# Patient Record
Sex: FEMALE | Race: ASIAN | NOT HISPANIC OR LATINO | URBAN - METROPOLITAN AREA
[De-identification: names, ages, dates, MRNs, and addresses within clinical notes are randomized per-mention and may not be internally consistent; named-entity substitution may affect disease eponyms.]

---

## 2023-08-11 ENCOUNTER — INPATIENT (INPATIENT)
Facility: HOSPITAL | Age: 23
LOS: 2 days | Discharge: HOME | End: 2023-08-14
Attending: UROLOGY | Admitting: UROLOGY
Payer: MEDICAID

## 2023-08-11 ENCOUNTER — TRANSCRIPTION ENCOUNTER (OUTPATIENT)
Age: 23
End: 2023-08-11

## 2023-08-11 VITALS
RESPIRATION RATE: 18 BRPM | SYSTOLIC BLOOD PRESSURE: 152 MMHG | TEMPERATURE: 102 F | HEART RATE: 135 BPM | DIASTOLIC BLOOD PRESSURE: 99 MMHG | OXYGEN SATURATION: 100 %

## 2023-08-11 DIAGNOSIS — N20.0 CALCULUS OF KIDNEY: ICD-10-CM

## 2023-08-11 LAB
ALBUMIN SERPL ELPH-MCNC: 3.9 G/DL — SIGNIFICANT CHANGE UP (ref 3.3–5)
ALP SERPL-CCNC: 85 U/L — SIGNIFICANT CHANGE UP (ref 40–120)
ALT FLD-CCNC: 21 U/L — SIGNIFICANT CHANGE UP (ref 4–33)
ANION GAP SERPL CALC-SCNC: 15 MMOL/L — HIGH (ref 7–14)
APPEARANCE UR: ABNORMAL
APTT BLD: 30.7 SEC — SIGNIFICANT CHANGE UP (ref 24.5–35.6)
AST SERPL-CCNC: 16 U/L — SIGNIFICANT CHANGE UP (ref 4–32)
BASOPHILS # BLD AUTO: 0.04 K/UL — SIGNIFICANT CHANGE UP (ref 0–0.2)
BASOPHILS NFR BLD AUTO: 0.2 % — SIGNIFICANT CHANGE UP (ref 0–2)
BILIRUB SERPL-MCNC: 0.4 MG/DL — SIGNIFICANT CHANGE UP (ref 0.2–1.2)
BILIRUB UR-MCNC: NEGATIVE — SIGNIFICANT CHANGE UP
BUN SERPL-MCNC: 5 MG/DL — LOW (ref 7–23)
CALCIUM SERPL-MCNC: 9.3 MG/DL — SIGNIFICANT CHANGE UP (ref 8.4–10.5)
CHLORIDE SERPL-SCNC: 99 MMOL/L — SIGNIFICANT CHANGE UP (ref 98–107)
CO2 SERPL-SCNC: 22 MMOL/L — SIGNIFICANT CHANGE UP (ref 22–31)
COLOR SPEC: YELLOW — SIGNIFICANT CHANGE UP
CREAT SERPL-MCNC: 0.51 MG/DL — SIGNIFICANT CHANGE UP (ref 0.5–1.3)
DIFF PNL FLD: ABNORMAL
EGFR: 133 ML/MIN/1.73M2 — SIGNIFICANT CHANGE UP
EOSINOPHIL # BLD AUTO: 0.05 K/UL — SIGNIFICANT CHANGE UP (ref 0–0.5)
EOSINOPHIL NFR BLD AUTO: 0.3 % — SIGNIFICANT CHANGE UP (ref 0–6)
GAS PNL BLDV: SIGNIFICANT CHANGE UP
GLUCOSE SERPL-MCNC: 105 MG/DL — HIGH (ref 70–99)
GLUCOSE UR QL: NEGATIVE MG/DL — SIGNIFICANT CHANGE UP
HCG SERPL-ACNC: <1 MIU/ML — SIGNIFICANT CHANGE UP
HCT VFR BLD CALC: 33.4 % — LOW (ref 34.5–45)
HGB BLD-MCNC: 10.4 G/DL — LOW (ref 11.5–15.5)
IANC: 15.25 K/UL — HIGH (ref 1.8–7.4)
IMM GRANULOCYTES NFR BLD AUTO: 0.5 % — SIGNIFICANT CHANGE UP (ref 0–0.9)
INR BLD: 1.27 RATIO — HIGH (ref 0.85–1.18)
KETONES UR-MCNC: 15 MG/DL
LEUKOCYTE ESTERASE UR-ACNC: ABNORMAL
LYMPHOCYTES # BLD AUTO: 1.82 K/UL — SIGNIFICANT CHANGE UP (ref 1–3.3)
LYMPHOCYTES # BLD AUTO: 9.6 % — LOW (ref 13–44)
MCHC RBC-ENTMCNC: 22.5 PG — LOW (ref 27–34)
MCHC RBC-ENTMCNC: 31.1 GM/DL — LOW (ref 32–36)
MCV RBC AUTO: 72.3 FL — LOW (ref 80–100)
MONOCYTES # BLD AUTO: 1.75 K/UL — HIGH (ref 0–0.9)
MONOCYTES NFR BLD AUTO: 9.2 % — SIGNIFICANT CHANGE UP (ref 2–14)
NEUTROPHILS # BLD AUTO: 15.25 K/UL — HIGH (ref 1.8–7.4)
NEUTROPHILS NFR BLD AUTO: 80.2 % — HIGH (ref 43–77)
NITRITE UR-MCNC: POSITIVE
NRBC # BLD: 0 /100 WBCS — SIGNIFICANT CHANGE UP (ref 0–0)
NRBC # FLD: 0 K/UL — SIGNIFICANT CHANGE UP (ref 0–0)
PH UR: 6.5 — SIGNIFICANT CHANGE UP (ref 5–8)
PLATELET # BLD AUTO: 411 K/UL — HIGH (ref 150–400)
POTASSIUM SERPL-MCNC: 3.4 MMOL/L — LOW (ref 3.5–5.3)
POTASSIUM SERPL-SCNC: 3.4 MMOL/L — LOW (ref 3.5–5.3)
PROT SERPL-MCNC: 8 G/DL — SIGNIFICANT CHANGE UP (ref 6–8.3)
PROT UR-MCNC: SIGNIFICANT CHANGE UP MG/DL
PROTHROM AB SERPL-ACNC: 14.2 SEC — HIGH (ref 9.5–13)
RBC # BLD: 4.62 M/UL — SIGNIFICANT CHANGE UP (ref 3.8–5.2)
RBC # FLD: 17 % — HIGH (ref 10.3–14.5)
SODIUM SERPL-SCNC: 136 MMOL/L — SIGNIFICANT CHANGE UP (ref 135–145)
SP GR SPEC: 1.01 — SIGNIFICANT CHANGE UP (ref 1–1.03)
UROBILINOGEN FLD QL: 1 MG/DL — SIGNIFICANT CHANGE UP (ref 0.2–1)
WBC # BLD: 19.01 K/UL — HIGH (ref 3.8–10.5)
WBC # FLD AUTO: 19.01 K/UL — HIGH (ref 3.8–10.5)

## 2023-08-11 PROCEDURE — 99285 EMERGENCY DEPT VISIT HI MDM: CPT

## 2023-08-11 PROCEDURE — 74176 CT ABD & PELVIS W/O CONTRAST: CPT | Mod: 26,MA

## 2023-08-11 RX ORDER — SODIUM CHLORIDE 9 MG/ML
1000 INJECTION INTRAMUSCULAR; INTRAVENOUS; SUBCUTANEOUS ONCE
Refills: 0 | Status: COMPLETED | OUTPATIENT
Start: 2023-08-11 | End: 2023-08-11

## 2023-08-11 RX ORDER — CEFTRIAXONE 500 MG/1
1000 INJECTION, POWDER, FOR SOLUTION INTRAMUSCULAR; INTRAVENOUS ONCE
Refills: 0 | Status: COMPLETED | OUTPATIENT
Start: 2023-08-11 | End: 2023-08-11

## 2023-08-11 RX ORDER — ACETAMINOPHEN 500 MG
1000 TABLET ORAL ONCE
Refills: 0 | Status: COMPLETED | OUTPATIENT
Start: 2023-08-11 | End: 2023-08-11

## 2023-08-11 RX ADMIN — Medication 1000 MILLIGRAM(S): at 18:09

## 2023-08-11 RX ADMIN — SODIUM CHLORIDE 1000 MILLILITER(S): 9 INJECTION INTRAMUSCULAR; INTRAVENOUS; SUBCUTANEOUS at 19:15

## 2023-08-11 RX ADMIN — CEFTRIAXONE 100 MILLIGRAM(S): 500 INJECTION, POWDER, FOR SOLUTION INTRAMUSCULAR; INTRAVENOUS at 18:38

## 2023-08-11 RX ADMIN — SODIUM CHLORIDE 1000 MILLILITER(S): 9 INJECTION INTRAMUSCULAR; INTRAVENOUS; SUBCUTANEOUS at 19:04

## 2023-08-11 RX ADMIN — SODIUM CHLORIDE 1000 MILLILITER(S): 9 INJECTION INTRAMUSCULAR; INTRAVENOUS; SUBCUTANEOUS at 17:45

## 2023-08-11 RX ADMIN — Medication 1000 MILLIGRAM(S): at 19:04

## 2023-08-11 RX ADMIN — CEFTRIAXONE 1000 MILLIGRAM(S): 500 INJECTION, POWDER, FOR SOLUTION INTRAMUSCULAR; INTRAVENOUS at 19:04

## 2023-08-11 RX ADMIN — Medication 400 MILLIGRAM(S): at 17:45

## 2023-08-11 NOTE — ED PROVIDER NOTE - PHYSICAL EXAMINATION
Physical Exam:  Gen: NAD, AOx3, non-toxic appearing, able to ambulate without assistance  Head: NCAT  HEENT: EOMI, PEERLA, normal conjunctiva, tongue midline, oral mucosa moist  Lung: CTAB, no respiratory distress, no wheezes/rhonchi/rales B/L, speaking in full sentences  CV: RRR, no murmurs, rubs or gallops  Abd: soft, NT, ND, no guarding, no rigidity, no rebound tenderness, + CVA tenderness   MSK: no visible deformities, ROM normal in UE/LE, no back pain  Neuro: No focal sensory or motor deficits  Skin: Warm, well perfused, no rash, no leg swelling  Psych: normal affect, calm

## 2023-08-11 NOTE — ED ADULT TRIAGE NOTE - CHIEF COMPLAINT QUOTE
Pt c/o worsening L flank pain x 2 weeks. Diagnosed with kidney stones at Urgent Care. Pt given Tylenol at Urgent Care today but threw it up. Febrile and tachy in triage.

## 2023-08-11 NOTE — ED ADULT TRIAGE NOTE - TEMPERATURE IN CELSIUS (DEGREES C)
Pt just began with a cough 6 days ago and was SOB,   He then progressed to a cough with congestion and sputum is now green, and he has a PMH of pnuemonia 38.8

## 2023-08-11 NOTE — ED ADULT TRIAGE NOTE - MODE OF ARRIVAL
Patient has wife support at home, no d/c needs. Reason for Admission:  Coronary artery disease [I25.10]  CAD (coronary artery disease) [I25.10]  CAD (coronary artery disease) [I25.10]                 RRAT Score:    10            Plan for utilizing home health:    no                      Likelihood of Readmission:   LOW                         Transition of Care Plan:              Initial assessment completed with patient. Cognitive status of patient: oriented to time, place, person and situation. Face sheet information confirmed:  yes. The patient designates wife to participate in his discharge plan and to receive any needed information. This patient lives in a single family home with wife. Patient is able to navigate steps as needed. Prior to hospitalization, patient was considered to be independent with ADLs/IADLS : yes . Patient has a current ACP document on file: no  The wife will be available to transport patient home upon discharge. The patient has no medical equipment available in the home. Patient is not currently active with home health. Patient has not stayed in a skilled nursing facility or rehab. This patient is on dialysis :no     Currently, the discharge plan is Home. The patient states that he can obtain his medications from the pharmacy, and take his medications as directed. Patient's current insurance is Medicare and [address] [place] [address] Management Interventions  PCP Verified by CM:  Yes  Palliative Care Criteria Met (RRAT>21 & CHF Dx)?: No  Mode of Transport at Discharge: Self  Transition of Care Consult (CM Consult): Discharge Planning  Current Support Network: Lives with Spouse  Confirm Follow Up Transport: Family  Plan discussed with Pt/Family/Caregiver: Yes  Discharge Location  Discharge Placement: Home        CARMEL Stoddard  Case Management  [phone] Walk in

## 2023-08-11 NOTE — ED PROVIDER NOTE - ATTENDING CONTRIBUTION TO CARE
I performed a face-to-face evaluation of the patient and performed a history and physical examination. I agree with the history and physical examination. If this was a PA visit, I personally saw the patient with the PA and performed a substantive portion of the visit including all aspects of the medical decision making.    H/o renal stones. P/w several days L lower flank pain that started sharp and sudden. 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 P/w sepsis vital signs: F, tachycardia. Continued L lower flank pain. Concern for infected stone. Check sepsis labs (UA, UCx, BCx, lactate). Give antipyretics (if needed). Give IVF and ABx.

## 2023-08-11 NOTE — ED PROVIDER NOTE - CLINICAL SUMMARY MEDICAL DECISION MAKING FREE TEXT BOX
Patient presents emergency department complaining of flank pain.  Patient is hemodynamically stable.  Patient is tachycardic and febrile on presentation.  Patient meets sepsis criteria will obtain sepsis labs.  Differential diagnosis includes but is not limited to kidney stone, UTI versus pyelonephritis versus disposition

## 2023-08-11 NOTE — ED PROVIDER NOTE - OBJECTIVE STATEMENT
Patient is a 23-year-old female with no significant past medical history who presents emergency department planing of urinary symptoms and left-sided flank tenderness.  Patient states that 3 days ago she was diagnosed with a UTI and kidney stones and was put on Macrobid.  Patient states her symptoms did not resolve after 3 days and went to her primary care doctor today to get evaluated and was put on Bactrim.  However upon returning home she had increased fever and increased flank pain so she decided to come to the emergency department to get evaluated.  Patient states that it burns when she pees however denies any hematuria.  Patient states the flank pain is on the left side and intermittent in nature.  Patient has not taken anything for the pain today.  Patient states that she was nauseous earlier today but denies any nausea at this time.  Patient denies any chest pain, shortness of breath, abdominal pain, diarrhea, constipation Patient is a 23-year-old female with h/o renal stones who presents emergency department planing of urinary symptoms and left-sided flank tenderness.  Patient states that 3 days ago she was diagnosed with a UTI and kidney stones and was put on Macrobid.  Patient states her symptoms did not resolve after 3 days and went to her primary care doctor today to get evaluated and was put on Bactrim.  However upon returning home she had increased fever and increased flank pain so she decided to come to the emergency department to get evaluated.  Patient states that it burns when she pees however denies any hematuria.  Patient states the flank pain is on the left side and intermittent in nature.  Patient has not taken anything for the pain today.  Patient states that she was nauseous earlier today but denies any nausea at this time.  Patient denies any chest pain, shortness of breath, abdominal pain, diarrhea, constipation

## 2023-08-11 NOTE — ED ADULT NURSE NOTE - CHIEF COMPLAINT
How Severe Are Your Spot(S)?: moderate What Type Of Note Output Would You Prefer (Optional)?: Bullet Format What Is The Reason For Today's Visit?: Full Body Skin Examination with No Concerns What Is The Reason For Today's Visit? (Being Monitored For X): concerning skin lesions on an annual basis The patient is a 23y Female complaining of flank pain.

## 2023-08-11 NOTE — ED ADULT NURSE NOTE - OBJECTIVE STATEMENT
patient AOX4 came in c/o left flank pain with fever and chills since few days. on CM shows sinus tach. skin warm and d ry, breathing even and unlabored. NAD, VS as noted. labs done as ordered. meds given as ordered. awaiting results and re eval.

## 2023-08-12 DIAGNOSIS — A41.89 OTHER SPECIFIED SEPSIS: ICD-10-CM

## 2023-08-12 DIAGNOSIS — N20.1 CALCULUS OF URETER: ICD-10-CM

## 2023-08-12 LAB
ANION GAP SERPL CALC-SCNC: 13 MMOL/L — SIGNIFICANT CHANGE UP (ref 7–14)
BUN SERPL-MCNC: 4 MG/DL — LOW (ref 7–23)
CALCIUM SERPL-MCNC: 8.4 MG/DL — SIGNIFICANT CHANGE UP (ref 8.4–10.5)
CHLORIDE SERPL-SCNC: 106 MMOL/L — SIGNIFICANT CHANGE UP (ref 98–107)
CO2 SERPL-SCNC: 20 MMOL/L — LOW (ref 22–31)
CREAT SERPL-MCNC: 0.42 MG/DL — LOW (ref 0.5–1.3)
EGFR: 139 ML/MIN/1.73M2 — SIGNIFICANT CHANGE UP
GLUCOSE SERPL-MCNC: 94 MG/DL — SIGNIFICANT CHANGE UP (ref 70–99)
HCT VFR BLD CALC: 32.7 % — LOW (ref 34.5–45)
HGB BLD-MCNC: 9.8 G/DL — LOW (ref 11.5–15.5)
MCHC RBC-ENTMCNC: 22.3 PG — LOW (ref 27–34)
MCHC RBC-ENTMCNC: 30 GM/DL — LOW (ref 32–36)
MCV RBC AUTO: 74.5 FL — LOW (ref 80–100)
NRBC # BLD: 0 /100 WBCS — SIGNIFICANT CHANGE UP (ref 0–0)
NRBC # FLD: 0 K/UL — SIGNIFICANT CHANGE UP (ref 0–0)
PLATELET # BLD AUTO: 339 K/UL — SIGNIFICANT CHANGE UP (ref 150–400)
POTASSIUM SERPL-MCNC: 3.1 MMOL/L — LOW (ref 3.5–5.3)
POTASSIUM SERPL-SCNC: 3.1 MMOL/L — LOW (ref 3.5–5.3)
RBC # BLD: 4.39 M/UL — SIGNIFICANT CHANGE UP (ref 3.8–5.2)
RBC # FLD: 17 % — HIGH (ref 10.3–14.5)
SODIUM SERPL-SCNC: 139 MMOL/L — SIGNIFICANT CHANGE UP (ref 135–145)
WBC # BLD: 10.53 K/UL — HIGH (ref 3.8–10.5)
WBC # FLD AUTO: 10.53 K/UL — HIGH (ref 3.8–10.5)

## 2023-08-12 PROCEDURE — 52332 CYSTOSCOPY AND TREATMENT: CPT | Mod: LT

## 2023-08-12 PROCEDURE — 74420 UROGRAPHY RTRGR +-KUB: CPT | Mod: 26

## 2023-08-12 PROCEDURE — 99222 1ST HOSP IP/OBS MODERATE 55: CPT | Mod: 57,25

## 2023-08-12 DEVICE — URETERAL CATH OPEN END 5FR 70CM: Type: IMPLANTABLE DEVICE | Site: LEFT | Status: FUNCTIONAL

## 2023-08-12 DEVICE — GUIDEWIRE SENSOR DUAL-FLEX NITINOL STRAIGHT .038" X 150CM: Type: IMPLANTABLE DEVICE | Site: LEFT | Status: FUNCTIONAL

## 2023-08-12 DEVICE — URETERAL STENT PERCUFLEX PLUS 6FR 24CM: Type: IMPLANTABLE DEVICE | Site: LEFT | Status: FUNCTIONAL

## 2023-08-12 RX ORDER — FENTANYL CITRATE 50 UG/ML
50 INJECTION INTRAVENOUS
Refills: 0 | Status: DISCONTINUED | OUTPATIENT
Start: 2023-08-12 | End: 2023-08-12

## 2023-08-12 RX ORDER — SODIUM CHLORIDE 9 MG/ML
1000 INJECTION, SOLUTION INTRAVENOUS
Refills: 0 | Status: DISCONTINUED | OUTPATIENT
Start: 2023-08-12 | End: 2023-08-13

## 2023-08-12 RX ORDER — ONDANSETRON 8 MG/1
4 TABLET, FILM COATED ORAL ONCE
Refills: 0 | Status: DISCONTINUED | OUTPATIENT
Start: 2023-08-12 | End: 2023-08-12

## 2023-08-12 RX ORDER — POTASSIUM CHLORIDE 20 MEQ
20 PACKET (EA) ORAL
Refills: 0 | Status: COMPLETED | OUTPATIENT
Start: 2023-08-12 | End: 2023-08-12

## 2023-08-12 RX ORDER — KETOROLAC TROMETHAMINE 30 MG/ML
15 SYRINGE (ML) INJECTION EVERY 6 HOURS
Refills: 0 | Status: DISCONTINUED | OUTPATIENT
Start: 2023-08-12 | End: 2023-08-14

## 2023-08-12 RX ORDER — TAMSULOSIN HYDROCHLORIDE 0.4 MG/1
0.4 CAPSULE ORAL AT BEDTIME
Refills: 0 | Status: DISCONTINUED | OUTPATIENT
Start: 2023-08-12 | End: 2023-08-14

## 2023-08-12 RX ORDER — OXYCODONE HYDROCHLORIDE 5 MG/1
5 TABLET ORAL ONCE
Refills: 0 | Status: DISCONTINUED | OUTPATIENT
Start: 2023-08-12 | End: 2023-08-12

## 2023-08-12 RX ORDER — CEFTRIAXONE 500 MG/1
1000 INJECTION, POWDER, FOR SOLUTION INTRAMUSCULAR; INTRAVENOUS EVERY 24 HOURS
Refills: 0 | Status: DISCONTINUED | OUTPATIENT
Start: 2023-08-12 | End: 2023-08-14

## 2023-08-12 RX ORDER — OXYCODONE HYDROCHLORIDE 5 MG/1
5 TABLET ORAL EVERY 4 HOURS
Refills: 0 | Status: DISCONTINUED | OUTPATIENT
Start: 2023-08-12 | End: 2023-08-12

## 2023-08-12 RX ORDER — KETOROLAC TROMETHAMINE 30 MG/ML
15 SYRINGE (ML) INJECTION ONCE
Refills: 0 | Status: DISCONTINUED | OUTPATIENT
Start: 2023-08-12 | End: 2023-08-12

## 2023-08-12 RX ORDER — ACETAMINOPHEN 500 MG
975 TABLET ORAL EVERY 6 HOURS
Refills: 0 | Status: DISCONTINUED | OUTPATIENT
Start: 2023-08-12 | End: 2023-08-14

## 2023-08-12 RX ORDER — METOCLOPRAMIDE HCL 10 MG
10 TABLET ORAL ONCE
Refills: 0 | Status: DISCONTINUED | OUTPATIENT
Start: 2023-08-12 | End: 2023-08-12

## 2023-08-12 RX ORDER — HEPARIN SODIUM 5000 [USP'U]/ML
5000 INJECTION INTRAVENOUS; SUBCUTANEOUS EVERY 8 HOURS
Refills: 0 | Status: DISCONTINUED | OUTPATIENT
Start: 2023-08-12 | End: 2023-08-14

## 2023-08-12 RX ORDER — FENTANYL CITRATE 50 UG/ML
25 INJECTION INTRAVENOUS
Refills: 0 | Status: DISCONTINUED | OUTPATIENT
Start: 2023-08-12 | End: 2023-08-12

## 2023-08-12 RX ADMIN — Medication 20 MILLIEQUIVALENT(S): at 15:09

## 2023-08-12 RX ADMIN — HEPARIN SODIUM 5000 UNIT(S): 5000 INJECTION INTRAVENOUS; SUBCUTANEOUS at 22:16

## 2023-08-12 RX ADMIN — SODIUM CHLORIDE 150 MILLILITER(S): 9 INJECTION, SOLUTION INTRAVENOUS at 04:58

## 2023-08-12 RX ADMIN — SODIUM CHLORIDE 125 MILLILITER(S): 9 INJECTION, SOLUTION INTRAVENOUS at 06:57

## 2023-08-12 RX ADMIN — Medication 15 MILLIGRAM(S): at 00:59

## 2023-08-12 RX ADMIN — HEPARIN SODIUM 5000 UNIT(S): 5000 INJECTION INTRAVENOUS; SUBCUTANEOUS at 13:29

## 2023-08-12 RX ADMIN — Medication 15 MILLIGRAM(S): at 07:18

## 2023-08-12 RX ADMIN — Medication 15 MILLIGRAM(S): at 18:53

## 2023-08-12 RX ADMIN — OXYCODONE HYDROCHLORIDE 5 MILLIGRAM(S): 5 TABLET ORAL at 12:45

## 2023-08-12 RX ADMIN — OXYCODONE HYDROCHLORIDE 5 MILLIGRAM(S): 5 TABLET ORAL at 11:50

## 2023-08-12 RX ADMIN — Medication 20 MILLIEQUIVALENT(S): at 16:40

## 2023-08-12 RX ADMIN — Medication 15 MILLIGRAM(S): at 19:01

## 2023-08-12 RX ADMIN — Medication 15 MILLIGRAM(S): at 07:33

## 2023-08-12 RX ADMIN — Medication 975 MILLIGRAM(S): at 23:09

## 2023-08-12 RX ADMIN — SODIUM CHLORIDE 125 MILLILITER(S): 9 INJECTION, SOLUTION INTRAVENOUS at 16:41

## 2023-08-12 RX ADMIN — TAMSULOSIN HYDROCHLORIDE 0.4 MILLIGRAM(S): 0.4 CAPSULE ORAL at 22:16

## 2023-08-12 RX ADMIN — SODIUM CHLORIDE 125 MILLILITER(S): 9 INJECTION, SOLUTION INTRAVENOUS at 22:16

## 2023-08-12 RX ADMIN — HEPARIN SODIUM 5000 UNIT(S): 5000 INJECTION INTRAVENOUS; SUBCUTANEOUS at 06:57

## 2023-08-12 RX ADMIN — Medication 975 MILLIGRAM(S): at 23:30

## 2023-08-12 NOTE — BRIEF OPERATIVE NOTE - NSICDXBRIEFPROCEDURE_GEN_ALL_CORE_FT
PROCEDURES:  Cystoscopy with percutaneous insertion of ureteral stent 12-Aug-2023 06:28:18  Clemente Crum

## 2023-08-12 NOTE — PATIENT PROFILE ADULT - PATIENT'S SEXUAL ORIENTATION
Ventricular Rate : 93   Atrial Rate : 138   QRS Duration : 80   Q-T Interval : 372   QTC Calculation(Bezet) : 462   R Axis : 69   T Axis : 71   Diagnosis : Atrial fibrillation~Abnormal ECG~When compared with ECG of 30-APR-2018 23:20,~Nonspecific T wave abnormality now evident in Lateral leads~Confirmed by JAX HINKLE ([id number]) on 5/1/2018 12:52:31 PM      Heterosexual

## 2023-08-12 NOTE — H&P ADULT - NSHPLABSRESULTS_GEN_ALL_CORE
10.4   19.01 )-----------( 411      ( 11 Aug 2023 17:45 )             33.4   08-    136  |  99  |  5<L>  ----------------------------<  105<H>  3.4<L>   |  22  |  0.51    Ca    9.3      11 Aug 2023 17:45    TPro  8.0  /  Alb  3.9  /  TBili  0.4  /  DBili  x   /  AST  16  /  ALT  21  /  AlkPhos  85  08    Urinalysis Basic - ( 11 Aug 2023 20:56 )    Color: Yellow / Appearance: Cloudy / S.012 / pH: x  Gluc: x / Ketone: 15 mg/dL  / Bili: Negative / Urobili: 1.0 mg/dL   Blood: x / Protein: Trace mg/dL / Nitrite: Positive   Leuk Esterase: Moderate / RBC: 4 /HPF / WBC 40 /HPF   Sq Epi: x / Non Sq Epi: 17 /HPF / Bacteria: Moderate /HPF    < from: CT Abdomen and Pelvis No Cont (23 @ 23:38) >    IMPRESSION:  2 mm left UVJ stone with upstream asymmetric enlargement of the left   kidney, without fady hydroureteronephrosis or perinephric fluid   collections.    5.0 cm right adnexal cyst.    --- End of Report ---    AZUL OATES MD; Resident Radiologist  This document has been electronically signed.  BRADLEY CORONA MD; Attending Radiologist  This document has been electronically signed. Aug 12 2023  3:09AM    < end of copied text >

## 2023-08-12 NOTE — CHART NOTE - NSCHARTNOTEFT_GEN_A_CORE
SURGICAL POST-OP CHECK NOTE:    Procedure: L ureteral stent    Subjective: doing well post op. no complaints. pain controlled, stable, afebrile.     Vital Signs Last 24 Hrs  T(C): 36.8 (12 Aug 2023 11:40), Max: 38.8 (11 Aug 2023 16:18)  T(F): 98.2 (12 Aug 2023 11:40), Max: 101.9 (11 Aug 2023 16:18)  HR: 78 (12 Aug 2023 11:40) (78 - 135)  BP: 135/79 (12 Aug 2023 11:40) (123/86 - 152/99)  BP(mean): 91 (12 Aug 2023 11:00) (90 - 97)  RR: 15 (12 Aug 2023 11:40) (15 - 24)  SpO2: 100% (12 Aug 2023 11:40) (95% - 100%)    Parameters below as of 12 Aug 2023 11:40  Patient On (Oxygen Delivery Method): room air      I&O's Summary    11 Aug 2023 07:01  -  12 Aug 2023 07:00  --------------------------------------------------------  IN: 175 mL / OUT: 0 mL / NET: 175 mL    12 Aug 2023 07:01  -  12 Aug 2023 16:01  --------------------------------------------------------  IN: 400 mL / OUT: 550 mL / NET: -150 mL                            9.8    10.53 )-----------( 339      ( 12 Aug 2023 06:42 )             32.7     08-12    139  |  106  |  4<L>  ----------------------------<  94  3.1<L>   |  20<L>  |  0.42<L>    Ca    8.4      12 Aug 2023 06:42    TPro  8.0  /  Alb  3.9  /  TBili  0.4  /  DBili  x   /  AST  16  /  ALT  21  /  AlkPhos  85  08-11   PT/INR - ( 11 Aug 2023 17:45 )   PT: 14.2 sec;   INR: 1.27 ratio         PTT - ( 11 Aug 2023 17:45 )  PTT:30.7 sec    PHYSICAL EXAM:  Gen: NAD, resting comfortably, conversive.   Cardiopulm: Non-labored breathing.   Ab: Soft, appropriately tender, nondistended. No CVAT  Ext: Moves all 4 spontaneously.     ASSESSMENT:   24yo F with L ureteral stent    PLAN:   Antibiotics pending cultures  Fluids  Regular diet  monitor for fevers

## 2023-08-12 NOTE — ED ADULT NURSE REASSESSMENT NOTE - NS ED NURSE REASSESS COMMENT FT1
Patient brought to OR by surgical resident. Valuables collected and brought to security. Receipt to be tubed to #[phone].
patient c/o nausea, pending CT. awaiting UA and CT, will continue to monitor

## 2023-08-12 NOTE — H&P ADULT - PROBLEM SELECTOR PLAN 1
Admit to Urology  -Emergent operative stent placement  -f/u all cultures  -continue Ceftriaxone  -IVF  -Analgesia and antiemetics prn  -NPO  -Discussed with Dr. Lopez

## 2023-08-12 NOTE — PROVIDER CONTACT NOTE (MEDICATION) - RECOMMENDATIONS
patient has no order for severe pain, paged MD mentioned re: med for severe pain, MD verbalized to give patient tylenol at this time.

## 2023-08-12 NOTE — H&P ADULT - NSHPPHYSICALEXAM_GEN_ALL_CORE
Vital Signs Last 24 Hrs  T(C): 37.8 (11 Aug 2023 19:01), Max: 38.8 (11 Aug 2023 16:18)  T(F): 100.1 (11 Aug 2023 19:01), Max: 101.9 (11 Aug 2023 16:18)  HR: 98 (11 Aug 2023 20:50) (98 - 135)  BP: 130/76 (11 Aug 2023 20:50) (129/76 - 152/99)  BP(mean): --  RR: 20 (11 Aug 2023 20:50) (18 - 20)  SpO2: 100% (11 Aug 2023 20:50) (98% - 100%)    Parameters below as of 11 Aug 2023 20:50  Patient On (Oxygen Delivery Method): room air    General: A+O, no distress    Pulm: clear    CV: regular    Abdomen: soft, nontender    : +L CVAT    Extremities: no tenderness    Neuro: nonfocal    Psych: appropriate

## 2023-08-12 NOTE — H&P ADULT - HISTORY OF PRESENT ILLNESS
24 y/o female with prior h/o nephrolithiasis without prior interventions presenting with 3 days of left sided flank pain, nausea, and fevers.  Reports she initially had dysuria and was started on Macrobid three days ago.  She was transitioned to Bactrim yesterday after having no improvement.  Developed a fever to 103 at home.  Reports nausea with multiple episodes of emesis.  Febrile in ED to 101.9  with tachycardia to 135.  CT revealing 2mm L UVJ stone.

## 2023-08-12 NOTE — H&P ADULT - ATTENDING COMMENTS
Sepsis 2/2 obstructive pyelo in setting of 2mm L UVJ stone. Pt febrile with tachycardia and leukocytosis. UA concerning for UTI. No real hydro on L but significant perinephric stranding and distal stone as above. Plan on emergent stent. Discussed potential for decompensation with pt--lower risk given age and lack of comorbid conditions.

## 2023-08-12 NOTE — H&P ADULT - NSICDXFAMILYHX_GEN_ALL_CORE_FT
Fine for 3 months  
Medication: CITALOPRAM BUT THE GENERIC (COST)    PCP: Augustin Arceo MD  Preferred Contact Number:     mobile [phone]         Pharmacy:  [facility]    Patient instructed to call pharmacy directly for future refills.      Advised patient that the nurse will call if there are questions or concerns, otherwise refill processing may take 48-72 hours.     
Refill request for: Citalopram-due-last filled on 3/20/17.     LOV- 1/20/17.   
Refill sent.   
FAMILY HISTORY:  No pertinent family history in first degree relatives

## 2023-08-12 NOTE — H&P ADULT - ASSESSMENT
24 y/o female with h/o nephrolithiasis without prior interventions, presenting with fevers, L flank pain and nausea, in setting of 2mm L UVJ stone and sepsis

## 2023-08-13 LAB
ANION GAP SERPL CALC-SCNC: 11 MMOL/L — SIGNIFICANT CHANGE UP (ref 7–14)
BUN SERPL-MCNC: 5 MG/DL — LOW (ref 7–23)
CALCIUM SERPL-MCNC: 8.5 MG/DL — SIGNIFICANT CHANGE UP (ref 8.4–10.5)
CHLORIDE SERPL-SCNC: 106 MMOL/L — SIGNIFICANT CHANGE UP (ref 98–107)
CO2 SERPL-SCNC: 23 MMOL/L — SIGNIFICANT CHANGE UP (ref 22–31)
CREAT SERPL-MCNC: 0.42 MG/DL — LOW (ref 0.5–1.3)
EGFR: 139 ML/MIN/1.73M2 — SIGNIFICANT CHANGE UP
GLUCOSE SERPL-MCNC: 107 MG/DL — HIGH (ref 70–99)
HCT VFR BLD CALC: 28.4 % — LOW (ref 34.5–45)
HGB BLD-MCNC: 8.8 G/DL — LOW (ref 11.5–15.5)
MCHC RBC-ENTMCNC: 22.6 PG — LOW (ref 27–34)
MCHC RBC-ENTMCNC: 31 GM/DL — LOW (ref 32–36)
MCV RBC AUTO: 73 FL — LOW (ref 80–100)
NRBC # BLD: 0 /100 WBCS — SIGNIFICANT CHANGE UP (ref 0–0)
NRBC # FLD: 0 K/UL — SIGNIFICANT CHANGE UP (ref 0–0)
PLATELET # BLD AUTO: 423 K/UL — HIGH (ref 150–400)
POTASSIUM SERPL-MCNC: 3.6 MMOL/L — SIGNIFICANT CHANGE UP (ref 3.5–5.3)
POTASSIUM SERPL-SCNC: 3.6 MMOL/L — SIGNIFICANT CHANGE UP (ref 3.5–5.3)
RBC # BLD: 3.89 M/UL — SIGNIFICANT CHANGE UP (ref 3.8–5.2)
RBC # FLD: 16.8 % — HIGH (ref 10.3–14.5)
SODIUM SERPL-SCNC: 140 MMOL/L — SIGNIFICANT CHANGE UP (ref 135–145)
WBC # BLD: 11.67 K/UL — HIGH (ref 3.8–10.5)
WBC # FLD AUTO: 11.67 K/UL — HIGH (ref 3.8–10.5)

## 2023-08-13 PROCEDURE — 99232 SBSQ HOSP IP/OBS MODERATE 35: CPT

## 2023-08-13 RX ORDER — OXYCODONE HYDROCHLORIDE 5 MG/1
5 TABLET ORAL ONCE
Refills: 0 | Status: DISCONTINUED | OUTPATIENT
Start: 2023-08-13 | End: 2023-08-13

## 2023-08-13 RX ADMIN — HEPARIN SODIUM 5000 UNIT(S): 5000 INJECTION INTRAVENOUS; SUBCUTANEOUS at 14:39

## 2023-08-13 RX ADMIN — Medication 15 MILLIGRAM(S): at 18:08

## 2023-08-13 RX ADMIN — Medication 15 MILLIGRAM(S): at 18:38

## 2023-08-13 RX ADMIN — CEFTRIAXONE 100 MILLIGRAM(S): 500 INJECTION, POWDER, FOR SOLUTION INTRAMUSCULAR; INTRAVENOUS at 06:13

## 2023-08-13 RX ADMIN — OXYCODONE HYDROCHLORIDE 5 MILLIGRAM(S): 5 TABLET ORAL at 12:13

## 2023-08-13 RX ADMIN — Medication 15 MILLIGRAM(S): at 01:15

## 2023-08-13 RX ADMIN — HEPARIN SODIUM 5000 UNIT(S): 5000 INJECTION INTRAVENOUS; SUBCUTANEOUS at 21:00

## 2023-08-13 RX ADMIN — TAMSULOSIN HYDROCHLORIDE 0.4 MILLIGRAM(S): 0.4 CAPSULE ORAL at 21:00

## 2023-08-13 RX ADMIN — OXYCODONE HYDROCHLORIDE 5 MILLIGRAM(S): 5 TABLET ORAL at 13:15

## 2023-08-13 RX ADMIN — Medication 15 MILLIGRAM(S): at 00:58

## 2023-08-13 RX ADMIN — HEPARIN SODIUM 5000 UNIT(S): 5000 INJECTION INTRAVENOUS; SUBCUTANEOUS at 06:13

## 2023-08-14 ENCOUNTER — TRANSCRIPTION ENCOUNTER (OUTPATIENT)
Age: 23
End: 2023-08-14

## 2023-08-14 VITALS
SYSTOLIC BLOOD PRESSURE: 118 MMHG | DIASTOLIC BLOOD PRESSURE: 83 MMHG | OXYGEN SATURATION: 100 % | TEMPERATURE: 99 F | HEART RATE: 91 BPM | RESPIRATION RATE: 18 BRPM

## 2023-08-14 RX ORDER — CEFDINIR 250 MG/5ML
1 POWDER, FOR SUSPENSION ORAL
Qty: 20 | Refills: 0
Start: 2023-08-14 | End: 2023-08-23

## 2023-08-14 RX ADMIN — CEFTRIAXONE 100 MILLIGRAM(S): 500 INJECTION, POWDER, FOR SOLUTION INTRAMUSCULAR; INTRAVENOUS at 05:07

## 2023-08-14 RX ADMIN — HEPARIN SODIUM 5000 UNIT(S): 5000 INJECTION INTRAVENOUS; SUBCUTANEOUS at 05:07

## 2023-08-14 RX ADMIN — Medication 975 MILLIGRAM(S): at 05:07

## 2023-08-14 RX ADMIN — Medication 975 MILLIGRAM(S): at 06:07

## 2023-08-14 NOTE — DISCHARGE NOTE PROVIDER - CARE PROVIDER_API CALL
Brii Lopez  Urology  [address] [place] [address], [address] [address]  Phone: [phone]  Fax: [phone]  Follow Up Time:

## 2023-08-14 NOTE — DISCHARGE NOTE NURSING/CASE MANAGEMENT/SOCIAL WORK - NSDCPEFALRISK_GEN_ALL_CORE
For information on Fall & Injury Prevention, visit: [email or web address] OR  [email or web address] OR  https://www.cdc.gov/steadi/patient.html

## 2023-08-14 NOTE — DISCHARGE NOTE NURSING/CASE MANAGEMENT/SOCIAL WORK - NSDCPNINST_GEN_ALL_CORE
pt notify md if fever, chills, nausea, vomiting, pain not relieved by medication, vaginal bleeding, or return of presenting symptoms.

## 2023-08-14 NOTE — DISCHARGE NOTE NURSING/CASE MANAGEMENT/SOCIAL WORK - PATIENT PORTAL LINK FT
You can access the FollowMyHealth Patient Portal offered by [facility] by registering at the following website: [email or web address]. By joining [employer]’s FollowMyHealth portal, you will also be able to view your health information using other applications (apps) compatible with our system.

## 2023-08-14 NOTE — DISCHARGE NOTE PROVIDER - NSDCCPCAREPLAN_GEN_ALL_CORE_FT
PRINCIPAL DISCHARGE DIAGNOSIS  Diagnosis: Ureteral stone  Assessment and Plan of Treatment: Drink plenty of fluids  Call Dr's office for appt to remove stone/stent  Call office for a fever over 101      SECONDARY DISCHARGE DIAGNOSES  Diagnosis: Acute UTI  Assessment and Plan of Treatment:

## 2023-08-14 NOTE — PROGRESS NOTE ADULT - SUBJECTIVE AND OBJECTIVE BOX
POD #2  Afeb 130/85 95 99%RA    Pt has no c/o  Abd- soft NT ND   Void 1L
ANESTHESIA POSTOP CHECK    23y Female POSTOP DAY 1 S/P     Vital Signs Last 24 Hrs  T(C): 37.1 (14 Aug 2023 10:12), Max: 37.4 (14 Aug 2023 05:07)  T(F): 98.7 (14 Aug 2023 10:12), Max: 99.4 (14 Aug 2023 05:07)  HR: 91 (14 Aug 2023 10:12) (83 - 98)  BP: 118/83 (14 Aug 2023 10:12) (118/83 - 135/81)  BP(mean): --  RR: 18 (14 Aug 2023 10:12) (16 - 18)  SpO2: 100% (14 Aug 2023 10:12) (98% - 100%)    Parameters below as of 14 Aug 2023 10:12  Patient On (Oxygen Delivery Method): room air      I&O's Summary    13 Aug 2023 07:01  -  14 Aug 2023 07:00  --------------------------------------------------------  IN: 290 mL / OUT: 2300 mL / NET: -2010 mL        [ x] NO APPARENT ANESTHESIA COMPLICATIONS      
Subjective  AVSS. NAEON. Ambulating, pain controlled.     Objective    Vital signs  T(F): , Max: 98.4 (08-13-23 @ 01:00)  HR: 93 (08-13-23 @ 06:19)  BP: 126/87 (08-13-23 @ 06:19)  SpO2: 98% (08-13-23 @ 06:19)  Wt(kg): --    Output     OUT:    Voided (mL): 650 mL  Total OUT: 650 mL    Total NET: -650 mL          Gen: NAD  Abd: soft, nontender, nondistended  : voiding without issue    Labs      08-13 @ 06:13    WBC 11.67 / Hct 28.4  / SCr 0.42     08-12 @ 06:42    WBC 10.53 / Hct 32.7  / SCr 0.42         Culture - Blood (collected 08-11-23 @ 17:45)  Source: .Blood Blood-Peripheral  Preliminary Report (08-12-23 @ 22:02):    No growth at 24 hours    Culture - Blood (collected 08-11-23 @ 17:30)  Source: .Blood Blood-Peripheral  Preliminary Report (08-12-23 @ 22:02):    No growth at 24 hours        Urine Cx: pending  Blood Cx: pending    Imaging

## 2023-08-14 NOTE — DISCHARGE NOTE PROVIDER - HOSPITAL COURSE
Pt admitted with L. UVJ stone; 2mm; stent inserted; labs stable; growing E. coli in urine, blood NTD; ambulating; voiding well; no fever; earnest reg diet; pain controlled; home today on cefdinir; f/u in office for stone removal; will keep on culture list.

## 2023-08-15 PROBLEM — Z00.00 ENCOUNTER FOR PREVENTIVE HEALTH EXAMINATION: Status: ACTIVE | Noted: 2023-08-15

## 2023-08-15 LAB
-  AMIKACIN: SIGNIFICANT CHANGE UP
-  AMOXICILLIN/CLAVULANIC ACID: SIGNIFICANT CHANGE UP
-  AMPICILLIN/SULBACTAM: SIGNIFICANT CHANGE UP
-  AMPICILLIN: SIGNIFICANT CHANGE UP
-  AZTREONAM: SIGNIFICANT CHANGE UP
-  CEFAZOLIN: SIGNIFICANT CHANGE UP
-  CEFEPIME: SIGNIFICANT CHANGE UP
-  CEFTRIAXONE: SIGNIFICANT CHANGE UP
-  CEFUROXIME: SIGNIFICANT CHANGE UP
-  CIPROFLOXACIN: SIGNIFICANT CHANGE UP
-  ERTAPENEM: SIGNIFICANT CHANGE UP
-  GENTAMICIN: SIGNIFICANT CHANGE UP
-  IMIPENEM: SIGNIFICANT CHANGE UP
-  LEVOFLOXACIN: SIGNIFICANT CHANGE UP
-  MEROPENEM: SIGNIFICANT CHANGE UP
-  NITROFURANTOIN: SIGNIFICANT CHANGE UP
-  PIPERACILLIN/TAZOBACTAM: SIGNIFICANT CHANGE UP
-  TOBRAMYCIN: SIGNIFICANT CHANGE UP
-  TRIMETHOPRIM/SULFAMETHOXAZOLE: SIGNIFICANT CHANGE UP
CULTURE RESULTS: SIGNIFICANT CHANGE UP
METHOD TYPE: SIGNIFICANT CHANGE UP
ORGANISM # SPEC MICROSCOPIC CNT: SIGNIFICANT CHANGE UP
ORGANISM # SPEC MICROSCOPIC CNT: SIGNIFICANT CHANGE UP
SPECIMEN SOURCE: SIGNIFICANT CHANGE UP

## 2023-08-16 LAB
CULTURE RESULTS: SIGNIFICANT CHANGE UP
CULTURE RESULTS: SIGNIFICANT CHANGE UP
SPECIMEN SOURCE: SIGNIFICANT CHANGE UP
SPECIMEN SOURCE: SIGNIFICANT CHANGE UP

## 2023-09-11 ENCOUNTER — APPOINTMENT (OUTPATIENT)
Dept: UROLOGY | Facility: CLINIC | Age: 23
End: 2023-09-11
Payer: MEDICAID

## 2023-09-11 DIAGNOSIS — N20.1 CALCULUS OF URETER: ICD-10-CM

## 2023-09-11 DIAGNOSIS — Z78.9 OTHER SPECIFIED HEALTH STATUS: ICD-10-CM

## 2023-09-11 PROCEDURE — 99213 OFFICE O/P EST LOW 20 MIN: CPT

## 2023-09-14 ENCOUNTER — OUTPATIENT (OUTPATIENT)
Dept: OUTPATIENT SERVICES | Facility: HOSPITAL | Age: 23
LOS: 1 days | End: 2023-09-14
Payer: MEDICAID

## 2023-09-14 VITALS
DIASTOLIC BLOOD PRESSURE: 82 MMHG | OXYGEN SATURATION: 99 % | TEMPERATURE: 99 F | RESPIRATION RATE: 20 BRPM | HEART RATE: 112 BPM | HEIGHT: 59 IN | SYSTOLIC BLOOD PRESSURE: 116 MMHG | WEIGHT: 176.59 LBS

## 2023-09-14 DIAGNOSIS — Z01.818 ENCOUNTER FOR OTHER PREPROCEDURAL EXAMINATION: ICD-10-CM

## 2023-09-14 DIAGNOSIS — N20.0 CALCULUS OF KIDNEY: ICD-10-CM

## 2023-09-14 DIAGNOSIS — Z96.0 PRESENCE OF UROGENITAL IMPLANTS: Chronic | ICD-10-CM

## 2023-09-14 LAB
ANION GAP SERPL CALC-SCNC: 17 MMOL/L — SIGNIFICANT CHANGE UP (ref 5–17)
APPEARANCE: ABNORMAL
BACTERIA: ABNORMAL /HPF
BILIRUBIN URINE: NEGATIVE
BLOOD URINE: ABNORMAL
BUN SERPL-MCNC: 7 MG/DL — SIGNIFICANT CHANGE UP (ref 7–23)
CALCIUM SERPL-MCNC: 9.5 MG/DL — SIGNIFICANT CHANGE UP (ref 8.4–10.5)
CAST: 3 /LPF
CHLORIDE SERPL-SCNC: 98 MMOL/L — SIGNIFICANT CHANGE UP (ref 96–108)
CO2 SERPL-SCNC: 21 MMOL/L — LOW (ref 22–31)
COLOR: YELLOW
CREAT SERPL-MCNC: 0.53 MG/DL — SIGNIFICANT CHANGE UP (ref 0.5–1.3)
EGFR: 132 ML/MIN/1.73M2 — SIGNIFICANT CHANGE UP
EPITHELIAL CELLS: 4 /HPF
GLUCOSE QUALITATIVE U: NEGATIVE MG/DL
GLUCOSE SERPL-MCNC: 79 MG/DL — SIGNIFICANT CHANGE UP (ref 70–99)
HCT VFR BLD CALC: 32.6 % — LOW (ref 34.5–45)
HGB BLD-MCNC: 9.8 G/DL — LOW (ref 11.5–15.5)
KETONES URINE: NEGATIVE MG/DL
LEUKOCYTE ESTERASE URINE: ABNORMAL
MCHC RBC-ENTMCNC: 22.5 PG — LOW (ref 27–34)
MCHC RBC-ENTMCNC: 30.1 GM/DL — LOW (ref 32–36)
MCV RBC AUTO: 74.9 FL — LOW (ref 80–100)
MICROSCOPIC-UA: NORMAL
NITRITE URINE: POSITIVE
NRBC # BLD: 0 /100 WBCS — SIGNIFICANT CHANGE UP (ref 0–0)
PH URINE: 6.5
PLATELET # BLD AUTO: 433 K/UL — HIGH (ref 150–400)
POTASSIUM SERPL-MCNC: 3.4 MMOL/L — LOW (ref 3.5–5.3)
POTASSIUM SERPL-SCNC: 3.4 MMOL/L — LOW (ref 3.5–5.3)
PROTEIN URINE: 100 MG/DL
RBC # BLD: 4.35 M/UL — SIGNIFICANT CHANGE UP (ref 3.8–5.2)
RBC # FLD: 17.6 % — HIGH (ref 10.3–14.5)
RED BLOOD CELLS URINE: 106 /HPF
SODIUM SERPL-SCNC: 136 MMOL/L — SIGNIFICANT CHANGE UP (ref 135–145)
SPECIFIC GRAVITY URINE: 1.02
UROBILINOGEN URINE: 1 MG/DL
WBC # BLD: 19.47 K/UL — HIGH (ref 3.8–10.5)
WBC # FLD AUTO: 19.47 K/UL — HIGH (ref 3.8–10.5)
WHITE BLOOD CELLS URINE: 205 /HPF

## 2023-09-14 RX ORDER — SULFAMETHOXAZOLE AND TRIMETHOPRIM 800; 160 MG/1; MG/1
800-160 TABLET ORAL
Qty: 20 | Refills: 0 | Status: ACTIVE | COMMUNITY
Start: 2023-09-14 | End: 1900-01-01

## 2023-09-14 NOTE — H&P PST ADULT - ASSESSMENT
DASI score: 6.5  DASI activity: all house chores, 2 flights of stairs, goes for walks, no exercise routine but denies limit to activity  Loose teeth or denture: denies

## 2023-09-14 NOTE — H&P PST ADULT - HISTORY OF PRESENT ILLNESS
22 yo female presents to PST prior to scheduled Cystoscopy, left ureteroscopy, laser lithotripsy, left ureteral stent exchange on 10/3/2023 with Dr. Brii Lopze.  24 yo female presents to PST prior to scheduled Cystoscopy, left ureteroscopy, laser lithotripsy, left ureteral stent exchange on 10/3/2023 with Dr. Brii Lopez. Pmhx kidney stone (2023). Was admitted to [facility] last month with renal colic, fever, leukocytosis; had emergent stent placed. Discharged home on cefdinir/bactrim for e.coli in urine.   Patient reports fever (T. max 102) and vomiting since 9/12/23. Was seen by Dr. Lopez on 9/11/23; denied symptoms but urine culture from 9/11/23 +e.coli. Was seen by PCP on 9/12/23; c/o of symptoms but was not prescribed antibiotics, urine culture results were unavailable at the time. She has been taking tylenol prn for renal colic.     PST V/S: 116/82, , SPO2 100% RR 20, Temp 99.2. (Took Tylenol this AM). CBC/BMP drawn. Surgeon contacted. Patient declined to go to ER at this time. Was advised to go to ER if symptoms worsen.  ? 22 yo female presents to PST prior to scheduled Cystoscopy, left ureteroscopy, laser lithotripsy, left ureteral stent exchange on 10/3/2023 with Dr. Brii Lopez. Pmhx kidney stone (2023). Was admitted to [facility] last month with renal colic, fever, leukocytosis; had emergent stent placed. Discharged home on cefdinir/bactrim for e.coli in urine.   Patient reports fever (T. max 102) and vomiting since 9/12/23. Was seen by Dr. Lopez on 9/11/23; denied symptoms but urine culture from 9/11/23 +e.coli. Was seen by PCP on 9/12/23; c/o of symptoms but was not prescribed antibiotics, urine culture results were unavailable at the time. She has been taking tylenol prn for renal colic.     PST V/S: 116/82, , SPO2 100% RR 20, Temp 99.2. (Took Tylenol this AM). CBC/BMP drawn. Surgeon contacted. Patient declined to go to ER at this time. Was advised to go to ER if symptoms worsen.  ?  ** Addendum **  9/15/23 11:00 AM  Dr. Lopez prescribed Bactrim BID; final urine culture result sensitive to Bactrim. Patient advised to go to ER if symptoms do not improve/worsen. Left voicemail for patient.  24 yo female presents to PST prior to scheduled Cystoscopy, left ureteroscopy, laser lithotripsy, left ureteral stent exchange on 10/3/2023 with Dr. Brii Lopez. Pmhx kidney stone (2023). Was admitted to [facility] last month with renal colic, fever, leukocytosis; had emergent stent placed. Discharged home on cefdinir/bactrim for e.coli in urine.   Patient reports fever (T. max 102) and vomiting since 9/12/23. Was seen by Dr. Lopez on 9/11/23; denied symptoms but urine culture from 9/11/23 +e.coli. Was seen by PCP on 9/12/23; c/o of symptoms but was not prescribed antibiotics, urine culture results were unavailable at the time. She has been taking tylenol prn for renal colic.     PST V/S: 116/82, , SPO2 100% RR 20, Temp 99.2. (Took Tylenol this AM). CBC/BMP drawn. Surgeon contacted. Patient declined to go to ER at this time. Was advised to go to ER if symptoms worsen.  ?  ** Addendum **  9/15/23 11:00 AM  Dr. Lopez prescribed Bactrim BID; final urine culture result sensitive to Bactrim. Patient advised to go to ER if symptoms do not improve/worsen. Left voicemail for patient. Case discussed with Dr. Orozco.

## 2023-09-14 NOTE — H&P PST ADULT - PROBLEM SELECTOR PLAN 1
Cystoscopy, left ureteroscopy, laser lithotripsy, left ureteral stent exchange on 10/3/23 with Dr. Brii Lopez.  Pre-op instructions given. Questions answered.

## 2023-09-14 NOTE — H&P PST ADULT - ATTENDING COMMENTS
Persistent positive urine cultures. Pt on abx and plan for surgical stone clearance to prevnt infection. Discussed higher infection/sepsis risk with pt.

## 2023-09-15 PROBLEM — N20.0 CALCULUS OF KIDNEY: Chronic | Status: ACTIVE | Noted: 2023-09-14

## 2023-09-15 LAB — BACTERIA UR CULT: ABNORMAL

## 2023-09-18 ENCOUNTER — TRANSCRIPTION ENCOUNTER (OUTPATIENT)
Age: 23
End: 2023-09-18

## 2023-09-19 ENCOUNTER — TRANSCRIPTION ENCOUNTER (OUTPATIENT)
Age: 23
End: 2023-09-19

## 2023-09-20 ENCOUNTER — APPOINTMENT (OUTPATIENT)
Dept: UROLOGY | Facility: HOSPITAL | Age: 23
End: 2023-09-20

## 2023-09-20 ENCOUNTER — OUTPATIENT (OUTPATIENT)
Dept: INPATIENT UNIT | Facility: HOSPITAL | Age: 23
LOS: 1 days | End: 2023-09-20
Payer: MEDICAID

## 2023-09-20 ENCOUNTER — RESULT REVIEW (OUTPATIENT)
Age: 23
End: 2023-09-20

## 2023-09-20 ENCOUNTER — TRANSCRIPTION ENCOUNTER (OUTPATIENT)
Age: 23
End: 2023-09-20

## 2023-09-20 VITALS
HEART RATE: 89 BPM | RESPIRATION RATE: 16 BRPM | TEMPERATURE: 98 F | SYSTOLIC BLOOD PRESSURE: 136 MMHG | DIASTOLIC BLOOD PRESSURE: 88 MMHG | HEIGHT: 59 IN | WEIGHT: 176.59 LBS | OXYGEN SATURATION: 100 %

## 2023-09-20 VITALS
OXYGEN SATURATION: 100 % | RESPIRATION RATE: 16 BRPM | DIASTOLIC BLOOD PRESSURE: 90 MMHG | HEART RATE: 87 BPM | TEMPERATURE: 98 F | SYSTOLIC BLOOD PRESSURE: 134 MMHG

## 2023-09-20 DIAGNOSIS — Z96.0 PRESENCE OF UROGENITAL IMPLANTS: Chronic | ICD-10-CM

## 2023-09-20 DIAGNOSIS — N20.0 CALCULUS OF KIDNEY: ICD-10-CM

## 2023-09-20 LAB — HCG UR QL: NEGATIVE — SIGNIFICANT CHANGE UP

## 2023-09-20 PROCEDURE — C1889: CPT

## 2023-09-20 PROCEDURE — 88300 SURGICAL PATH GROSS: CPT | Mod: 26

## 2023-09-20 PROCEDURE — 76000 FLUOROSCOPY <1 HR PHYS/QHP: CPT

## 2023-09-20 PROCEDURE — 82365 CALCULUS SPECTROSCOPY: CPT

## 2023-09-20 PROCEDURE — 74420 UROGRAPHY RTRGR +-KUB: CPT | Mod: 26

## 2023-09-20 PROCEDURE — 52352 CYSTOURETERO W/STONE REMOVE: CPT | Mod: LT

## 2023-09-20 PROCEDURE — 80048 BASIC METABOLIC PNL TOTAL CA: CPT

## 2023-09-20 PROCEDURE — C1758: CPT

## 2023-09-20 PROCEDURE — C1769: CPT

## 2023-09-20 PROCEDURE — C2617: CPT

## 2023-09-20 PROCEDURE — 88300 SURGICAL PATH GROSS: CPT

## 2023-09-20 PROCEDURE — 52332 CYSTOSCOPY AND TREATMENT: CPT | Mod: LT

## 2023-09-20 PROCEDURE — 85027 COMPLETE CBC AUTOMATED: CPT

## 2023-09-20 PROCEDURE — G0463: CPT

## 2023-09-20 PROCEDURE — 81025 URINE PREGNANCY TEST: CPT

## 2023-09-20 DEVICE — GUIDEWIRE SENSOR DUAL-FLEX NITINOL STRAIGHT .035" X 150CM: Type: IMPLANTABLE DEVICE | Site: LEFT | Status: FUNCTIONAL

## 2023-09-20 DEVICE — STONE BASKET ZEROTIP NITINOL 4-WIRE 1.9FR 120CM X 12MM: Type: IMPLANTABLE DEVICE | Site: LEFT | Status: FUNCTIONAL

## 2023-09-20 DEVICE — URETERAL STENT CONTOUR 6FR 24CM: Type: IMPLANTABLE DEVICE | Site: LEFT | Status: FUNCTIONAL

## 2023-09-20 DEVICE — URETERAL CATH OPEN END 5FR 70CM: Type: IMPLANTABLE DEVICE | Site: LEFT | Status: FUNCTIONAL

## 2023-09-20 RX ORDER — ONDANSETRON 8 MG/1
4 TABLET, FILM COATED ORAL ONCE
Refills: 0 | Status: DISCONTINUED | OUTPATIENT
Start: 2023-09-20 | End: 2023-09-20

## 2023-09-20 RX ORDER — CEFAZOLIN SODIUM 1 G
2000 VIAL (EA) INJECTION ONCE
Refills: 0 | Status: COMPLETED | OUTPATIENT
Start: 2023-09-20 | End: 2023-09-20

## 2023-09-20 RX ORDER — SODIUM CHLORIDE 9 MG/ML
3 INJECTION INTRAMUSCULAR; INTRAVENOUS; SUBCUTANEOUS EVERY 8 HOURS
Refills: 0 | Status: DISCONTINUED | OUTPATIENT
Start: 2023-09-20 | End: 2023-09-20

## 2023-09-20 RX ORDER — ACETAMINOPHEN 500 MG
2 TABLET ORAL
Refills: 0 | DISCHARGE

## 2023-09-20 RX ORDER — HYDROMORPHONE HYDROCHLORIDE 2 MG/ML
0.5 INJECTION INTRAMUSCULAR; INTRAVENOUS; SUBCUTANEOUS
Refills: 0 | Status: DISCONTINUED | OUTPATIENT
Start: 2023-09-20 | End: 2023-09-20

## 2023-09-20 RX ORDER — SODIUM CHLORIDE 9 MG/ML
1000 INJECTION, SOLUTION INTRAVENOUS
Refills: 0 | Status: DISCONTINUED | OUTPATIENT
Start: 2023-09-20 | End: 2023-09-20

## 2023-09-20 RX ADMIN — SODIUM CHLORIDE 3 MILLILITER(S): 9 INJECTION INTRAMUSCULAR; INTRAVENOUS; SUBCUTANEOUS at 11:28

## 2023-09-20 NOTE — ASU DISCHARGE PLAN (ADULT/PEDIATRIC) - NS MD DC FALL RISK RISK
For information on Fall & Injury Prevention, visit: [email or web address] OR  [email or web address] OR  https://www.cdc.gov/steadi/patient.html

## 2023-09-20 NOTE — ASU DISCHARGE PLAN (ADULT/PEDIATRIC) - ASU DC SPECIAL INSTRUCTIONSFT
Discharge Instructions: Ureteroscopy    · Stent: You have an internal stent (a hollow tube that runs from the kidney to your bladder) after your procedure, which helps urine drain from the kidney to your bladder. Some patients experience urinary frequency, burning, or even back pain (especially with urination). These sensations will gradually get better. Increasing your fluid intake can also improve these symptoms. While the stent is in place, your urine may continue to be bloody. This stent is temporary and must be removed or exchanged by your urologist as an outpatient within 3 months unless otherwise specified.    · General: It is common to have blood in the urine after your procedure. It may be pink or even red; inform your doctor if you have a significant amount of clot in the urine or if you are unable to void at all. The urine may clear and then become bloody again especially as you are more physically active.    · Bathing: You may shower or bathe.    · Diet: You may resume your regular diet and regular medication regimen.    · Pain: You may take Tylenol (acetaminophen) 650-975mg and/or Motrin (ibuprofen) 400-600mg, available over the counter, for pain every 6 hours as needed. Do not exceed 4000 milligrams of Tylenol (acetaminophen) daily. You may alternate these medications such that you take either one every 3 hours.    · Antibiotics: You have been given a prescription for an antibiotic, please take this medication as instructed and be sure to complete entire course.    · Stool softeners: Do not allow yourself to become constipated as this may increase your bother from the stent and/or straining may cause bleeding. Take stool softeners (ex. Colace) or a laxative (ex. Senekot, ExLax), available over the counter, if needed.    · Activity: No heavy lifting or strenuous exercise until you are evaluated at your post-operative appointment. Otherwise, you may return to your usual level of activity.    · Follow-up: Follow up on Monday for stent removal    · Call your urologist if: You have any bleeding that does not stop, inability to void >8 hours, fever over 100.4 F, chills, persistent nausea/vomiting, or if your pain is not controlled on your discharge pain medications.

## 2023-09-20 NOTE — ASU DISCHARGE PLAN (ADULT/PEDIATRIC) - CARE PROVIDER_API CALL
Md unknown Brii Lopez  Urology  [address] [place] [address], [address] [address]  Phone: [phone]  Fax: [phone]  Follow Up Time:

## 2023-09-20 NOTE — BRIEF OPERATIVE NOTE - NSICDXBRIEFPROCEDURE_GEN_ALL_CORE_FT
PROCEDURES:  Cystoureteroscopy, with holmium laser lithotripsy and stent insertion 20-Sep-2023 13:00:30  Héctor Zhang

## 2023-09-20 NOTE — ASU PATIENT PROFILE, ADULT - FALL HARM RISK - UNIVERSAL INTERVENTIONS
Bed in lowest position, wheels locked, appropriate side rails in place/Call bell, personal items and telephone in reach/Instruct patient to call for assistance before getting out of bed or chair/Non-slip footwear when patient is out of bed/[place] to call system/Physically safe environment - no spills, clutter or unnecessary equipment/Purposeful Proactive Rounding/Room/bathroom lighting operational, light cord in reach

## 2023-09-21 ENCOUNTER — NON-APPOINTMENT (OUTPATIENT)
Age: 23
End: 2023-09-21

## 2023-09-25 ENCOUNTER — OUTPATIENT (OUTPATIENT)
Dept: OUTPATIENT SERVICES | Facility: HOSPITAL | Age: 23
LOS: 1 days | End: 2023-09-25
Payer: MEDICAID

## 2023-09-25 ENCOUNTER — APPOINTMENT (OUTPATIENT)
Dept: UROLOGY | Facility: CLINIC | Age: 23
End: 2023-09-25
Payer: MEDICAID

## 2023-09-25 DIAGNOSIS — Z96.0 PRESENCE OF UROGENITAL IMPLANTS: Chronic | ICD-10-CM

## 2023-09-25 DIAGNOSIS — R35.0 FREQUENCY OF MICTURITION: ICD-10-CM

## 2023-09-25 DIAGNOSIS — N20.0 CALCULUS OF KIDNEY: ICD-10-CM

## 2023-09-25 PROCEDURE — 52310 CYSTOSCOPY AND TREATMENT: CPT

## 2023-09-27 LAB — SURGICAL PATHOLOGY STUDY: SIGNIFICANT CHANGE UP

## 2023-09-29 LAB
CELL MATERIAL STONE EST-MCNT: SIGNIFICANT CHANGE UP
LABORATORY COMMENT REPORT: SIGNIFICANT CHANGE UP
NIDUS STONE QN: SIGNIFICANT CHANGE UP

## 2023-10-04 DIAGNOSIS — N20.0 CALCULUS OF KIDNEY: ICD-10-CM

## 2024-03-21 ENCOUNTER — APPOINTMENT (OUTPATIENT)
Dept: UROLOGY | Facility: CLINIC | Age: 24
End: 2024-03-21

## 2024-06-08 ENCOUNTER — NON-APPOINTMENT (OUTPATIENT)
Age: 24
End: 2024-06-08

## 2025-07-10 NOTE — H&P PST ADULT - BP NONINVASIVE MEAN (MM HG)
1300  Easton ambulated into room for reclast infusion. Pt rights and responsibilities offered to her. Infusion discussed and questions were answered.      1311 Reclast  sliding scale looks within normal limits for labs.  Reclast infusion started and Easton has call light within reach.     1330  infusion complete and Easton tolerated well.     1350  d/c instructions discussed and Easton verbalized understanding. Easton ambulated out for d/c via self today.         _m___ Safety:       (Environmental)  [place] to environment  Ensure ID band is correct and in place/ allergy band as needed  Assess for fall risk  Initiate fall precautions as applicable (fall band, side rails, etc.)  Call light within reach  Bed in low position/ wheels locked    _m___ Pain:       Assess pain level and characteristics  Administer analgesics as ordered  Assess effectiveness of pain management and report to MD as needed    _m___ Knowledge Deficit:  Assess baseline knowledge  Provide teaching at level of understanding  Provide teaching via preferred learning method  Evaluate teaching effectiveness    m____ Hemodynamic/Respiratory Status:       (Pre and Post Procedure Monitoring)  Assess/Monitor vital signs and LOC  Assess Baseline SpO2 prior to any sedation  Obtain weight/height  Assess vital signs/ LOC until patient meets discharge criteria  Monitor procedure site and notify MD of any issues       93

## (undated) DEVICE — FOLEY TRAY 16FR 5CC LTX UMETER CLOSED

## (undated) DEVICE — SOL IRR BAG NS 0.9% 3000ML

## (undated) DEVICE — GOWN TRIMAX LG

## (undated) DEVICE — ADAPTER CHECK FLO 9FR STERILE

## (undated) DEVICE — SYR ASEPTO

## (undated) DEVICE — DRAPE EQUIPMENT BANDED BAG 30 X 30" (SHOWER CAP)

## (undated) DEVICE — BOSTON SCIENTIFC PUMPING SYSTEM SAPS SINGLE ACTION 10CC

## (undated) DEVICE — SOL IRR BAG H2O 3000ML

## (undated) DEVICE — POSITIONER STRAP ARMBOARD VELCRO TS-30

## (undated) DEVICE — PACK CYSTO

## (undated) DEVICE — FOLEY HOLDER STATLOCK 2 WAY ADULT

## (undated) DEVICE — GLV 8 PROTEXIS (WHITE)

## (undated) DEVICE — POSITIONER FOAM HEADREST (PINK)

## (undated) DEVICE — CANISTER DISPOSABLE THIN WALL 3000CC

## (undated) DEVICE — SOL IRR POUR H2O 500ML

## (undated) DEVICE — SOL IRR POUR H2O 1500ML

## (undated) DEVICE — POSITIONER FOAM EGG CRATE ULNAR 2PCS (PINK)

## (undated) DEVICE — VENODYNE/SCD SLEEVE CALF MEDIUM

## (undated) DEVICE — SOL IRR POUR NS 0.9% 500ML

## (undated) DEVICE — GLV 7.5 PROTEXIS (WHITE)

## (undated) DEVICE — WARMING BLANKET UPPER ADULT

## (undated) DEVICE — VENODYNE/SCD SLEEVE CALF LARGE

## (undated) DEVICE — TUBING LEVEL ONE NORMOFLO SET

## (undated) DEVICE — TUBING SUCTION 20FT

## (undated) DEVICE — TUBING RANGER FLUID IRRIGATION SET DISP

## (undated) DEVICE — GLV 6 PROTEXIS (WHITE)

## (undated) DEVICE — ACMI SELF-SEALING SEAL UP TO 7FR

## (undated) DEVICE — IRR BULB PATHFINDER + 10"

## (undated) DEVICE — GLV 6.5 PROTEXIS (WHITE)

## (undated) DEVICE — DRAPE COVER SNAP 36X30"

## (undated) DEVICE — GLV 7 PROTEXIS (WHITE)

## (undated) DEVICE — DRAPE TOWEL BLUE 17" X 24"

## (undated) DEVICE — TUBING THERMADX UROLOGY

## (undated) DEVICE — SYR LUER LOK 10CC